# Patient Record
Sex: FEMALE | Race: WHITE | HISPANIC OR LATINO | URBAN - METROPOLITAN AREA
[De-identification: names, ages, dates, MRNs, and addresses within clinical notes are randomized per-mention and may not be internally consistent; named-entity substitution may affect disease eponyms.]

---

## 2020-01-26 ENCOUNTER — EMERGENCY (EMERGENCY)
Facility: HOSPITAL | Age: 25
LOS: 1 days | Discharge: HOME | End: 2020-01-26
Attending: EMERGENCY MEDICINE
Payer: COMMERCIAL

## 2020-01-26 VITALS
HEIGHT: 66 IN | WEIGHT: 169.98 LBS | SYSTOLIC BLOOD PRESSURE: 87 MMHG | OXYGEN SATURATION: 100 % | TEMPERATURE: 99 F | RESPIRATION RATE: 18 BRPM | DIASTOLIC BLOOD PRESSURE: 56 MMHG | HEART RATE: 85 BPM

## 2020-01-26 PROCEDURE — 99282 EMERGENCY DEPT VISIT SF MDM: CPT

## 2020-01-26 PROCEDURE — 99283 EMERGENCY DEPT VISIT LOW MDM: CPT

## 2020-01-26 NOTE — ED PROVIDER NOTE - ATTENDING CONTRIBUTION TO CARE
I personally saw the patient with the PA, and completed the key components of the history and physical exam. I then discussed the management plan with the PA.   gen in nad gcs 15 resp clear cardiac no murmur abd soft neuro intact   agree with pa plan of care
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
## 2020-01-26 NOTE — ED PROVIDER NOTE - NSFOLLOWUPINSTRUCTIONS_ED_ALL_ED_FT
- Acetaminophen 650mg ONLY every 4-6 hours as needed for pain.   - Please call to schedule follow up appointment with your primary care physician within 24-48 hours.  - Please seek immediate medical attention for any new/worsening, signs/symptoms, or concerns.    Feel better!

## 2020-01-26 NOTE — ED PROVIDER NOTE - PATIENT PORTAL LINK FT
You can access the FollowMyHealth Patient Portal offered by [facility] by registering at the following website: [email or web address]. By joining "[employer]’s FollowMyHealth portal, you will also be able to view your health information using other applications (apps) compatible with our system.
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
## 2021-03-23 NOTE — ED ADULT TRIAGE NOTE - NS ED NURSE BANDS TYPE
Health Maintenance Due   Topic Date Due   â¢ Pneumococcal Vaccine 0-64 (1 of 1 - PPSV23) Never done   â¢ Shingles Vaccine (1 of 2) Never done   â¢ Colorectal Cancer Screen-  Never done   â¢ Hepatitis B Vaccine (3 of 3 - Risk 3-dose series) 11/13/2015   â¢ Diabetes Eye Exam  02/01/2017   â¢ Influenza Vaccine (1) Never done   â¢ Breast Cancer Screening  04/11/2021       Patient is due for topics as listed above but is not proceeding with Immunization(s) Hep B, Influenza, Pneumococcal and Shingles, Colorectal Cancer Screening: Colonoscopy, Diabetes Eye Exam and Mammogram at this time. Name band;